# Patient Record
Sex: FEMALE | Race: WHITE | Employment: UNEMPLOYED | URBAN - METROPOLITAN AREA
[De-identification: names, ages, dates, MRNs, and addresses within clinical notes are randomized per-mention and may not be internally consistent; named-entity substitution may affect disease eponyms.]

---

## 2017-12-20 PROBLEM — E11.21 TYPE 2 DIABETES MELLITUS WITH NEPHROPATHY (HCC): Status: ACTIVE | Noted: 2017-12-20

## 2019-08-27 ENCOUNTER — HOSPITAL ENCOUNTER (OUTPATIENT)
Dept: ULTRASOUND IMAGING | Age: 64
Discharge: HOME | End: 2019-08-27
Attending: INTERNAL MEDICINE
Payer: MEDICARE

## 2019-08-27 DIAGNOSIS — N18.30 CKD (CHRONIC KIDNEY DISEASE), STAGE III (HCC): ICD-10-CM

## 2019-08-27 PROCEDURE — 76770 US EXAM ABDO BACK WALL COMP: CPT

## 2021-08-05 ENCOUNTER — APPOINTMENT (OUTPATIENT)
Dept: GENERAL RADIOLOGY | Age: 66
End: 2021-08-05
Attending: PHYSICIAN ASSISTANT
Payer: MEDICARE

## 2021-08-05 ENCOUNTER — HOSPITAL ENCOUNTER (EMERGENCY)
Age: 66
Discharge: HOME | End: 2021-08-05
Attending: STUDENT IN AN ORGANIZED HEALTH CARE EDUCATION/TRAINING PROGRAM
Payer: MEDICARE

## 2021-08-05 VITALS
OXYGEN SATURATION: 98 % | BODY MASS INDEX: 30.49 KG/M2 | RESPIRATION RATE: 18 BRPM | WEIGHT: 183 LBS | HEIGHT: 65 IN | DIASTOLIC BLOOD PRESSURE: 68 MMHG | TEMPERATURE: 97.3 F | HEART RATE: 75 BPM | SYSTOLIC BLOOD PRESSURE: 158 MMHG

## 2021-08-05 DIAGNOSIS — M62.838 MUSCLE SPASM: ICD-10-CM

## 2021-08-05 DIAGNOSIS — M25.511 ACUTE PAIN OF RIGHT SHOULDER: Primary | ICD-10-CM

## 2021-08-05 DIAGNOSIS — S46.811A STRAIN OF RIGHT TRAPEZIUS MUSCLE, INITIAL ENCOUNTER: ICD-10-CM

## 2021-08-05 PROCEDURE — 73030 X-RAY EXAM OF SHOULDER: CPT

## 2021-08-05 PROCEDURE — 74011250637 HC RX REV CODE- 250/637: Performed by: PHYSICIAN ASSISTANT

## 2021-08-05 PROCEDURE — 99283 EMERGENCY DEPT VISIT LOW MDM: CPT

## 2021-08-05 RX ORDER — HYDROCODONE BITARTRATE AND ACETAMINOPHEN 5; 325 MG/1; MG/1
1 TABLET ORAL ONCE
Status: COMPLETED | OUTPATIENT
Start: 2021-08-05 | End: 2021-08-05

## 2021-08-05 RX ORDER — LIDOCAINE 50 MG/G
PATCH TOPICAL
Qty: 5 EACH | Refills: 0 | Status: SHIPPED | OUTPATIENT
Start: 2021-08-05

## 2021-08-05 RX ORDER — ONDANSETRON 8 MG/1
8 TABLET, ORALLY DISINTEGRATING ORAL
Status: COMPLETED | OUTPATIENT
Start: 2021-08-05 | End: 2021-08-05

## 2021-08-05 RX ORDER — HYDROCODONE BITARTRATE AND ACETAMINOPHEN 5; 325 MG/1; MG/1
1 TABLET ORAL
Qty: 10 TABLET | Refills: 0 | Status: SHIPPED | OUTPATIENT
Start: 2021-08-05 | End: 2021-08-08

## 2021-08-05 RX ORDER — METHOCARBAMOL 500 MG/1
500 TABLET, FILM COATED ORAL 4 TIMES DAILY
Qty: 15 TABLET | Refills: 0 | Status: SHIPPED | OUTPATIENT
Start: 2021-08-05 | End: 2021-08-10

## 2021-08-05 RX ORDER — ONDANSETRON 4 MG/1
4 TABLET, ORALLY DISINTEGRATING ORAL
Qty: 12 TABLET | Refills: 0 | Status: SHIPPED | OUTPATIENT
Start: 2021-08-05 | End: 2021-08-09

## 2021-08-05 RX ADMIN — ONDANSETRON 8 MG: 8 TABLET, ORALLY DISINTEGRATING ORAL at 12:08

## 2021-08-05 RX ADMIN — HYDROCODONE BITARTRATE AND ACETAMINOPHEN 1 TABLET: 5; 325 TABLET ORAL at 12:08

## 2021-08-05 NOTE — ED PROVIDER NOTES
49-year-old female comes in with concern for right arm/shoulder and neck pain. Patient reports that she recently had an injury to her left shoulder about 2 weeks ago and/since that time she has been compensating by using her right arm more frequently and she believes this is how she may have injured it. She denies numbness tingling or weakness to the extremities. She says the pain radiates down the right arm.            Past Medical History:   Diagnosis Date    Abdominal pain     Anemia     history of with FE po    Anemia     Angina     Arthritis     OA- bilat hands    Back pain     Backache, unspecified     CAD (coronary artery disease)     3 blockages- 30-50%- aspirin therapy    Carpal tunnel syndrome     Chronic pain     left knee- OA per pt    CRI (chronic renal insufficiency)     Diabetes mellitus type II, uncontrolled ([facility] [address]     Encounter for long-term (current) use of other medications     Extensor carpi radialis brevis tenosynovitis     GERD (gastroesophageal reflux disease)     controlled with meds    Heart murmur     life long- denies SOB or fainting- can walk at average pace for 2 blocks without SOB- difficulty with stairs due to knee pain    Hormone replacement therapy     Hypercholesterolemia     medicated    Hyperlipidemia     Hypertension     controlled with meds    Hypokalemia     Hypomagnesemia     Knee pain     Nausea & vomiting     Hx of-     Neoplasm of uncertain behavior of skin     Neuropathy     Obesity     BMI 35.3    Postmenopausal disorder     Reflux esophagitis     Tremor, essential     Wears dentures        Past Surgical History:   Procedure Laterality Date    HX APPENDECTOMY  1976    with cholecystectomy    HX HEART CATHETERIZATION  2008    3 blockages 30-50% range    HX OPEN CHOLECYSTECTOMY  1976    with appendectomy    HX ORTHOPAEDIC  2010    left knee surgery    HX ORTHOPAEDIC  1990    CTR bilat,     HX ELÍAS AND BSO  1985         Family History: Problem Relation Age of Onset    Diabetes Mother         type 2    Heart Disease Mother     Hypertension Mother     Diabetes Sister         type 2    Heart Disease Sister     Hypertension Sister     Stroke Sister     Diabetes Brother         type 2    Heart Disease Brother     Heart Disease Brother     Heart Disease Brother     Heart Disease Brother     Heart Disease Brother     Stroke Father     Heart Disease Paternal Grandfather     Diabetes Maternal Grandmother        Social History     Socioeconomic History    Marital status:      Spouse name: Not on file    Number of children: Not on file    Years of education: Not on file    Highest education level: Not on file   Occupational History    Not on file   Tobacco Use    Smoking status: Never Smoker    Smokeless tobacco: Never Used   Substance and Sexual Activity    Alcohol use: No    Drug use: No    Sexual activity: Not on file   Other Topics Concern    Not on file   Social History Narrative    Not on file     Social Determinants of Health     Financial Resource Strain:     Difficulty of Paying Living Expenses:    Food Insecurity:     Worried About Running Out of Food in the Last Year:     [address] in the Last Year:    Transportation Needs:     Lack of Transportation (Medical):  Lack of Transportation (Non-Medical):    Physical Activity:     Days of Exercise per Week:     Minutes of Exercise per Session:    Stress:     Feeling of Stress :    Social Connections:     Frequency of Communication with Friends and Family:     Frequency of Social Gatherings with Friends and Family:     Attends Rastafari Services:     Active Member of Clubs or Organizations:     Attends Club or Organization Meetings:     Marital Status:    Intimate Partner Violence:     Fear of Current or Ex-Partner:     Emotionally Abused:     Physically Abused:     Sexually Abused:           ALLERGIES: Codeine    Review of Systems Constitutional: Negative for chills and fever. Respiratory: Negative for cough and shortness of breath. Cardiovascular: Negative for chest pain, palpitations and leg swelling. Musculoskeletal: Positive for arthralgias, myalgias and neck pain. Negative for back pain and neck stiffness. Skin: Negative for color change. All other systems reviewed and are negative. Vitals:    08/05/21 1022 08/05/21 1139 08/05/21 1209 08/05/21 1238   BP: (!) 154/75 (!) 157/67 (!) 152/67 (!) 158/68   Pulse: 73   75   Resp: 18   18   Temp: 97.3 °F (36.3 °C)      SpO2: 98%   98%   Weight: 83 kg (183 lb)      Height: 5' 5\" (1.651 m)               Physical Exam  Vitals and nursing note reviewed. Constitutional:       General: She is not in acute distress. Appearance: Normal appearance. She is not ill-appearing, toxic-appearing or diaphoretic. HENT:      Head: Normocephalic and atraumatic. Eyes:      Conjunctiva/sclera: Conjunctivae normal.   Cardiovascular:      Rate and Rhythm: Normal rate and regular rhythm. Pulses: Normal pulses. Heart sounds: Normal heart sounds. No murmur heard. Pulmonary:      Effort: Pulmonary effort is normal. No respiratory distress. Breath sounds: Normal air entry. No stridor, decreased air movement or transmitted upper airway sounds. No decreased breath sounds. Musculoskeletal:      Right shoulder: Tenderness and bony tenderness present. No swelling, deformity, effusion or crepitus. Decreased range of motion. Normal strength. Normal pulse. Left shoulder: Normal.      Cervical back: Spasms and tenderness present. Normal range of motion. Thoracic back: Normal.      Lumbar back: Normal.        Back:       Comments: Patient has tenderness to the right trapezius with spasm noted. Mild tenderness medial to the right scapula without bony tenderness noted.   The right shoulder is mildly tender to the lateral and anterior aspect without deformity crepitus or step-off. Patient neurovascularly intact. Skin:     General: Skin is warm and dry. Neurological:      General: No focal deficit present. Mental Status: She is alert and oriented to person, place, and time. Mental status is at baseline. MDM  Number of Diagnoses or Management Options  Acute pain of right shoulder: new and requires workup  Muscle spasm: new and requires workup  Strain of right trapezius muscle, initial encounter: new and requires workup  Diagnosis management comments: Shoulder x-ray was obtained and came back negative for acute abnormality. Patient was given a dose of Norco with Zofran and she tolerated this well and says that her pain improved significantly. I will prescribe a short course of Norco as the patient reports history of kidney problems and says she cannot take any NSAIDs. She also reports she cannot tolerate tramadol due to side effects. Encouraged rest, ice, compression, elevation. Recommend close follow-up with the PCP and muscle relaxant was also prescribed with sedation warning given. Return precautions discussed.        Amount and/or Complexity of Data Reviewed  Tests in the radiology section of CPT®: reviewed and ordered  Tests in the medicine section of CPT®: reviewed and ordered           Procedures

## 2021-08-05 NOTE — DISCHARGE INSTRUCTIONS
You may take Tylenol every 4-6 hours for pain or take Norco every 6 hours as needed for severe pain. You may take the muscle relaxant Robaxin every 6-8 hours as needed for muscle spasms and pain but do not drive or operate machinery while taking the above medications. May apply Lidoderm patches to the area as needed for breakthrough symptom relief. Rest, apply ice and elevate the affected area. Please follow-up with the primary doctor and return for emergent or severely worsening symptoms.

## 2021-08-05 NOTE — ED TRIAGE NOTES
Pt ambulatory unassisted to triage with mask in place. Pt complains of R arm pain onset Tuesday. Reports she injured her L arm and has been compensating by pulling up and using R arm more. Denies any falls or other injury.

## 2021-08-05 NOTE — ED NOTES
I have reviewed discharge instructions with the patient and spouse. The patient and spouse verbalized understanding. Patient left ED via Discharge Method: ambulatory to Home with (spouse  Opportunity for questions and clarification provided. Patient given 4 scripts. No esign          To continue your aftercare when you leave the hospital, you may receive an automated call from our care team to check in on how you are doing. This is a free service and part of our promise to provide the best care and service to meet your aftercare needs.  If you have questions, or wish to unsubscribe from this service please call [phone]. Thank you for Choosing our [address] [place] [address] Emergency Department.

## 2022-03-18 PROBLEM — S46.811A STRAIN OF RIGHT TRAPEZIUS MUSCLE: Status: ACTIVE | Noted: 2021-08-05

## 2022-03-18 PROBLEM — M62.838 MUSCLE SPASM: Status: ACTIVE | Noted: 2021-08-05

## 2022-03-19 PROBLEM — E11.21 TYPE 2 DIABETES MELLITUS WITH NEPHROPATHY (HCC): Status: ACTIVE | Noted: 2017-12-20

## 2022-03-19 PROBLEM — M25.511 ACUTE PAIN OF RIGHT SHOULDER: Status: ACTIVE | Noted: 2021-08-05

## 2023-02-21 ENCOUNTER — HOSPITAL ENCOUNTER (EMERGENCY)
Age: 68
Discharge: HOME | End: 2023-02-21
Attending: STUDENT IN AN ORGANIZED HEALTH CARE EDUCATION/TRAINING PROGRAM
Payer: MEDICARE

## 2023-02-21 ENCOUNTER — APPOINTMENT (OUTPATIENT)
Dept: GENERAL RADIOLOGY | Age: 68
End: 2023-02-21
Payer: MEDICARE

## 2023-02-21 VITALS
DIASTOLIC BLOOD PRESSURE: 67 MMHG | RESPIRATION RATE: 15 BRPM | HEIGHT: 63 IN | HEART RATE: 80 BPM | WEIGHT: 199 LBS | OXYGEN SATURATION: 100 % | SYSTOLIC BLOOD PRESSURE: 157 MMHG | TEMPERATURE: 97.7 F | BODY MASS INDEX: 35.26 KG/M2

## 2023-02-21 DIAGNOSIS — S46.912A STRAIN OF LEFT SHOULDER, INITIAL ENCOUNTER: Primary | ICD-10-CM

## 2023-02-21 PROCEDURE — 73030 X-RAY EXAM OF SHOULDER: CPT

## 2023-02-21 PROCEDURE — 96372 THER/PROPH/DIAG INJ SC/IM: CPT

## 2023-02-21 PROCEDURE — 99284 EMERGENCY DEPT VISIT MOD MDM: CPT

## 2023-02-21 PROCEDURE — 72072 X-RAY EXAM THORAC SPINE 3VWS: CPT

## 2023-02-21 PROCEDURE — 6360000002 HC RX W HCPCS: Performed by: STUDENT IN AN ORGANIZED HEALTH CARE EDUCATION/TRAINING PROGRAM

## 2023-02-21 RX ORDER — IBUPROFEN 800 MG/1
800 TABLET ORAL EVERY 6 HOURS PRN
Qty: 20 TABLET | Refills: 0 | Status: SHIPPED | OUTPATIENT
Start: 2023-02-21

## 2023-02-21 RX ORDER — DEXAMETHASONE SODIUM PHOSPHATE 10 MG/ML
10 INJECTION INTRAMUSCULAR; INTRAVENOUS ONCE
Status: COMPLETED | OUTPATIENT
Start: 2023-02-21 | End: 2023-02-21

## 2023-02-21 RX ORDER — KETOROLAC TROMETHAMINE 15 MG/ML
15 INJECTION, SOLUTION INTRAMUSCULAR; INTRAVENOUS ONCE
Status: COMPLETED | OUTPATIENT
Start: 2023-02-21 | End: 2023-02-21

## 2023-02-21 RX ORDER — CYCLOBENZAPRINE HCL 10 MG
10 TABLET ORAL 3 TIMES DAILY PRN
Qty: 20 TABLET | Refills: 2 | Status: SHIPPED | OUTPATIENT
Start: 2023-02-21 | End: 2023-03-03

## 2023-02-21 RX ADMIN — KETOROLAC TROMETHAMINE 15 MG: 15 INJECTION, SOLUTION INTRAMUSCULAR; INTRAVENOUS at 08:38

## 2023-02-21 RX ADMIN — DEXAMETHASONE SODIUM PHOSPHATE 10 MG: 10 INJECTION INTRAMUSCULAR; INTRAVENOUS at 08:39

## 2023-02-21 ASSESSMENT — ENCOUNTER SYMPTOMS
SHORTNESS OF BREATH: 0
CHEST TIGHTNESS: 0

## 2023-02-21 ASSESSMENT — PAIN SCALES - GENERAL: PAINLEVEL_OUTOF10: 10

## 2023-02-21 NOTE — ED NOTES
I have reviewed discharge instructions with the patient and spouse. The patient and spouse verbalized understanding. Patient left ED via Discharge Method: ambulatory to Home with spouse    Opportunity for questions and clarification provided. Patient given 2 Escripts. To continue your aftercare when you leave the hospital, you may receive an automated call from our care team to check in on how you are doing. This is a free service and part of our promise to provide the best care and service to meet your aftercare needs.  If you have questions, or wish to unsubscribe from this service please call [phone]. Thank you for Choosing our [facility] Emergency Department.         Mark Farias RN  02/21/23 1051

## 2023-02-21 NOTE — DISCHARGE INSTRUCTIONS
Take medication prescribed as directed. Arrange follow-up with your primary care provider for recheck. Return to this department for worsening symptoms, concerns or questions.

## 2023-02-21 NOTE — ED PROVIDER NOTES
Emergency Department Provider Note                   PCP:                Raman Mcdonald MD               Age: 69 y.o. Sex: female     No diagnosis found. DISPOSITION          Medical Decision Making  78-year-old female patient presenting to this department with reports of left shoulder and upper thoracic pain after lifting a heavy object several days ago. She denies falls or trauma. She is very tender to palpation on exam.  Will obtain x-ray imaging of the left shoulder and upper thoracic spine. Orders placed for 1 dose of Decadron and Toradol. Patient does have history of diabetes, will forego any prolonged course of steroid. X-ray imaging of the affected shoulder and thoracic spine are unremarkable. Symptoms consistent with shoulder strain. Will treat with outpatient muscle relaxants and NSAIDs. Will provide information regarding importance of follow-up with primary care for recheck. Amount and/or Complexity of Data Reviewed  Radiology: ordered. Risk  Prescription drug management. Orders Placed This Encounter   Procedures    XR THORACIC SPINE (3 VIEWS)    XR SHOULDER LEFT (MIN 2 VIEWS)        Medications   Decadron 10mg IM (has no administration in time range)   ketorolac (TORADOL) injection 15 mg (has no administration in time range)       New Prescriptions    No medications on file        Jim Guerrero is a 69 y.o. female who presents to the Emergency Department with chief complaint of    Chief Complaint   Patient presents with    Shoulder Pain      78-year-old female patient presenting to this department with reports of left shoulder and upper back pain. States that she was lifting something heavy on Sunday and has had pain since. She denies falls or trauma. She isolates pain to the left shoulder, left upper back. She denies falls or trauma. No associated numbness, tingling or weakness. Her range of motion significantly limited by pain.   This progressed today prompting her visit to this department. She is taken nothing for symptoms thus far. There is no associated fever or chills. She denies any chest pain pressure or tightness. No shortness of breath. The history is provided by the patient. No  was used. Review of Systems   Constitutional:  Negative for chills and fever. Respiratory:  Negative for chest tightness and shortness of breath. Cardiovascular:  Negative for chest pain. Musculoskeletal:  Positive for arthralgias and myalgias. All other systems reviewed and are negative.     Past Medical History:   Diagnosis Date    Abdominal pain     Anemia     Anemia     history of with FE po    Arthritis     OA- bilat hands    Back pain     Backache, unspecified     CAD (coronary artery disease)     3 blockages- 30-50%- aspirin therapy    Carpal tunnel syndrome     Chronic pain     left knee- OA per pt    CRI (chronic renal insufficiency)     Diabetes mellitus type II, uncontrolled ([facility] [address]     Encounter for long-term (current) use of other medications     Extensor carpi radialis brevis tenosynovitis     GERD (gastroesophageal reflux disease)     controlled with meds    Heart murmur     life long- denies SOB or fainting- can walk at average pace for 2 blocks without SOB- difficulty with stairs due to knee pain    Hormone replacement therapy     Hypercholesterolemia     medicated    Hyperlipidemia     Hypertension     controlled with meds    Hypokalemia     Hypomagnesemia     Knee pain     Nausea & vomiting     Hx of-     Neoplasm of uncertain behavior of skin     Neuropathy     Obesity     BMI 35.3    Postmenopausal disorder     Reflux esophagitis     Tremor, essential     Wears dentures         Past Surgical History:   Procedure Laterality Date    APPENDECTOMY  1976    with cholecystectomy    CARDIAC CATHETERIZATION  2008    3 blockages 30-50% range    CHOLECYSTECTOMY, OPEN  1976    with appendectomy    ORTHOPEDIC SURGERY  2010    left knee surgery    ORTHOPEDIC SURGERY  1990    CTR bilat,     TOTAL ABDOMINAL HYSTERECTOMY W/ BILATERAL SALPINGOOPHORECTOMY  1985        Family History   Problem Relation Age of Onset    Diabetes Mother         type 2    Heart Disease Brother     Heart Disease Paternal Grandfather     Stroke Father     Hypertension Mother     Diabetes Sister         type 2    Heart Disease Sister     Hypertension Sister     Stroke Sister     Diabetes Brother         type 2    Heart Disease Brother     Heart Disease Brother     Heart Disease Brother     Heart Disease Brother     Heart Disease Mother     Diabetes Maternal Grandmother         Social History     Socioeconomic History    Marital status:    Tobacco Use    Smoking status: Never    Smokeless tobacco: Never   Substance and Sexual Activity    Alcohol use: No    Drug use: No         Codeine     Previous Medications    ACETAMINOPHEN (TYLENOL) 500 MG TABLET    Take 1,000 mg by mouth as needed    AMLODIPINE (NORVASC) 10 MG TABLET    Take 10 mg by mouth    AMOXICILLIN (AMOXIL) 500 MG CAPSULE    Take 500 mg by mouth 3 times daily    ASPIRIN 81 MG EC TABLET    Take 81 mg by mouth    ESTRADIOL (ESTRACE) 1 MG TABLET    Take 1 mg by mouth    FUROSEMIDE (LASIX) 20 MG TABLET    Take 20 mg by mouth    INSULIN DETEMIR (LEVEMIR) 100 UNIT/ML INJECTION PEN    Inject 40 Units into the skin    LIDOCAINE (LIDODERM) 5 %    Apply patch to the affected area for 12 hours a day and remove for 12 hours a day.     LINAGLIPTIN (TRADJENTA) 5 MG TABLET    Take 5 mg by mouth daily    LOSARTAN (COZAAR) 100 MG TABLET    Take 100 mg by mouth daily    MAGNESIUM OXIDE (MAG-OX) 400 MG TABLET    Take 400 mg by mouth 2 times daily    METFORMIN (GLUCOPHAGE) 1000 MG TABLET    Take 1,000 mg by mouth 2 times daily    METOPROLOL (LOPRESSOR) 100 MG TABLET    Take 100 mg by mouth 2 times daily    NITROGLYCERIN (NITROSTAT) 0.4 MG SL TABLET    Place 0.4 mg under the tongue    POTASSIUM CHLORIDE (MICRO-K) 10 MEQ EXTENDED RELEASE CAPSULE    Take 10 mEq by mouth 2 times daily    PRAVASTATIN (PRAVACHOL) 80 MG TABLET    Take 80 mg by mouth    PROMETHAZINE (PHENERGAN) 25 MG TABLET    Take 25 mg by mouth every 6 hours as needed    RANITIDINE (ZANTAC) 150 MG CAPSULE    Take 150 mg by mouth 2 times daily    SPIRONOLACTONE (ALDACTONE) 25 MG TABLET    Take 25 mg by mouth daily        Vitals signs and nursing note reviewed. Patient Vitals for the past 4 hrs:   Temp Pulse Resp BP SpO2   02/21/23 0828 97.7 °F (36.5 °C) 80 15 (!) 157/67 100 %          Physical Exam  Vitals and nursing note reviewed. Constitutional:       General: She is not in acute distress. Appearance: Normal appearance. She is not ill-appearing or toxic-appearing. HENT:      Head: Normocephalic and atraumatic. Right Ear: External ear normal.      Left Ear: External ear normal.      Nose: Nose normal.      Mouth/Throat:      Mouth: Mucous membranes are moist.   Eyes:      Extraocular Movements: Extraocular movements intact. Pulmonary:      Effort: Pulmonary effort is normal. No respiratory distress. Abdominal:      General: Abdomen is flat. Musculoskeletal:         General: Normal range of motion. Cervical back: Normal range of motion. Comments: Patient has significant pain with generalized palpation of the shoulder joint on the left side. This includes the area of the biceps tendon insertion and the [facility] generally. There is also reproducible pain over the posterior aspect of the left trapezius into the upper thoracic spine. While there is some discomfort with palpation of the midline thoracic spine no palpated step-off or deformity is noted. Pulses are palpated and normal in the left upper extremity.  strengths are equal bilaterally. No obvious deformity noted. Skin:     General: Skin is warm. Capillary Refill: Capillary refill takes less than 2 seconds.    Neurological:      Mental Status: She is alert and oriented to person, place, and time. Sensory: Sensation is intact. Deep Tendon Reflexes:      Reflex Scores:       Brachioradialis reflexes are 2+ on the right side and 2+ on the left side. Comments:  strengths, brachioradialis reflex normal.  Sensation maintained. Psychiatric:         Mood and Affect: Mood normal.        Procedures    No results found for any visits on 02/21/23. XR THORACIC SPINE (3 VIEWS)    (Results Pending)   XR SHOULDER LEFT (MIN 2 VIEWS)    (Results Pending)                       Voice dictation software was used during the making of this note. This software is not perfect and grammatical and other typographical errors may be present. This note has not been completely proofread for errors.         Mari Delong, DO  02/21/23 [address], DO  02/21/23 [facility]

## 2023-02-21 NOTE — ED TRIAGE NOTES
Pt ambulatory to triage. Pt reports she lifted something heavy on Sunday and now has left shoulder pain that radiates down the arm. Pt reports she injured this same shoulder 2 years ago after a fall. Pt denies numbness or tingling in her left arm or fingers.

## 2025-02-25 ENCOUNTER — HOSPITAL ENCOUNTER (OUTPATIENT)
Dept: MAMMOGRAPHY | Age: 70
Discharge: HOME | End: 2025-02-28
Payer: MEDICARE

## 2025-02-25 DIAGNOSIS — R92.8 ABNORMAL SCREENING MAMMOGRAM: ICD-10-CM

## 2025-02-25 PROCEDURE — 76642 ULTRASOUND BREAST LIMITED: CPT

## 2025-02-27 ENCOUNTER — HOSPITAL ENCOUNTER (OUTPATIENT)
Dept: MAMMOGRAPHY | Age: 70
End: 2025-02-27
Payer: MEDICARE

## 2025-02-27 DIAGNOSIS — N63.10 MASS OF RIGHT BREAST ON MAMMOGRAM: ICD-10-CM

## 2025-02-27 DIAGNOSIS — R93.89 ABNORMAL FINDING ON ULTRASOUND: ICD-10-CM

## 2025-02-27 PROCEDURE — 77065 DX MAMMO INCL CAD UNI: CPT

## 2025-02-27 PROCEDURE — 19083 BX BREAST 1ST LESION US IMAG: CPT

## 2025-02-27 PROCEDURE — 88305 TISSUE EXAM BY PATHOLOGIST: CPT

## 2025-02-27 PROCEDURE — A4648 IMPLANTABLE TISSUE MARKER: HCPCS

## 2025-02-27 PROCEDURE — 6360000002 HC RX W HCPCS: Performed by: FAMILY MEDICINE

## 2025-02-27 RX ORDER — LIDOCAINE HYDROCHLORIDE 10 MG/ML
5 INJECTION, SOLUTION INFILTRATION; PERINEURAL ONCE
Status: COMPLETED | OUTPATIENT
Start: 2025-02-27 | End: 2025-02-27

## 2025-02-27 RX ADMIN — LIDOCAINE HYDROCHLORIDE 5 ML: 10 INJECTION, SOLUTION INFILTRATION; PERINEURAL at 08:50

## 2025-03-03 ENCOUNTER — TELEPHONE (OUTPATIENT)
Dept: MAMMOGRAPHY | Age: 70
End: 2025-03-03

## 2025-03-03 NOTE — TELEPHONE ENCOUNTER
Called patient regarding biopsy results. Biopsy results came back as benign (normal). Radiologist is recommending she return for routine screening mammography in February 2026. Explained recommendation and she understood and agreed.